# Patient Record
Sex: FEMALE | NOT HISPANIC OR LATINO | URBAN - METROPOLITAN AREA
[De-identification: names, ages, dates, MRNs, and addresses within clinical notes are randomized per-mention and may not be internally consistent; named-entity substitution may affect disease eponyms.]

---

## 2020-08-05 ENCOUNTER — TELEPHONE ENCOUNTER (OUTPATIENT)
Dept: URBAN - METROPOLITAN AREA CLINIC 35 | Facility: CLINIC | Age: 30
End: 2020-08-05

## 2020-08-05 RX ORDER — PANTOPRAZOLE SODIUM 40 MG/1
1 TABLET TABLET, DELAYED RELEASE ORAL
Qty: 90 | Refills: 1 | OUTPATIENT
Start: 2020-02-05

## 2020-08-12 ENCOUNTER — TELEPHONE ENCOUNTER (OUTPATIENT)
Dept: URBAN - METROPOLITAN AREA CLINIC 35 | Facility: CLINIC | Age: 30
End: 2020-08-12

## 2020-09-11 ENCOUNTER — OFFICE VISIT (OUTPATIENT)
Dept: URBAN - METROPOLITAN AREA CLINIC 31 | Facility: CLINIC | Age: 30
End: 2020-09-11

## 2020-09-21 ENCOUNTER — OFFICE VISIT (OUTPATIENT)
Dept: URBAN - METROPOLITAN AREA CLINIC 35 | Facility: CLINIC | Age: 30
End: 2020-09-21

## 2020-09-21 VITALS
DIASTOLIC BLOOD PRESSURE: 70 MMHG | HEIGHT: 66 IN | TEMPERATURE: 97.7 F | BODY MASS INDEX: 22.18 KG/M2 | SYSTOLIC BLOOD PRESSURE: 110 MMHG | WEIGHT: 138 LBS

## 2020-09-21 RX ORDER — HYOSCYAMINE SULFATE 0.125 MG
1 TABLET ON THE TONGUE AND ALLOW TO DISSOLVE  AS NEEDED TABLET,DISINTEGRATING ORAL
Qty: 30 | Refills: 1 | Status: ACTIVE | COMMUNITY
Start: 2020-01-08

## 2020-09-21 RX ORDER — PANTOPRAZOLE SODIUM 40 MG/1
1 TABLET TABLET, DELAYED RELEASE ORAL
Qty: 90 | Refills: 1 | Status: ACTIVE | COMMUNITY
Start: 2020-02-05

## 2020-09-21 RX ORDER — MESALAMINE 1.2 G/1
TAKE 4 TABLETS (4.8 G TOTAL) BY MOUTH DAILY WITH BREAKFAST TABLET, DELAYED RELEASE ORAL
Qty: 360 UNSPECIFIED | Refills: 3 | Status: ACTIVE | COMMUNITY

## 2020-09-21 RX ORDER — VITAMIN E ACETATE 670 MG
1 TABLET CAPSULE ORAL ONCE A DAY
Qty: 30 | Status: ACTIVE | COMMUNITY

## 2020-09-21 RX ORDER — ONDANSETRON 4 MG/1
1 TABLET TABLET ORAL
Qty: 30 | Refills: 1 | Status: ACTIVE | COMMUNITY
Start: 2020-01-08

## 2020-09-21 RX ORDER — SODIUM, POTASSIUM,MAG SULFATES 17.5-3.13G
177 ML SOLUTION, RECONSTITUTED, ORAL ORAL ONCE
Qty: 1 KIT | Refills: 0 | OUTPATIENT
Start: 2020-09-21

## 2020-09-21 RX ORDER — FERROUS SULFATE 325(65) MG
1 TABLET TABLET ORAL ONCE A DAY
Qty: 30 | Status: ACTIVE | COMMUNITY

## 2020-09-21 NOTE — EXAM-MIGRATED EXAMINATIONS
GENERAL APPEARANCE: - alert, in no acute distress, well developed, well nourished;   HEAD: - normocephalic, atraumatic;   EYES: - sclera anicteric bilaterally;   ORAL CAVITY: - mucosa moist;   THROAT: - clear;   NECK/THYROID: - neck supple, full range of motion, no thyromegaly, trachea midline;   SKIN: - warm and dry, no spider angiomata, palmar erythema or icterus;   HEART: - no murmurs, regular rate and rhythm, S1, S2 normal;   LUNGS: - clear to auscultation bilaterally, good air movement, no wheezes, rales, rhonchi;   ABDOMEN: - bowel sounds present, no masses palpable, no organomegaly , no rebound tenderness, soft, nontender, nondistended;   MUSCULOSKELETAL: - normal posture, normal gait and station, no decreased range of motion;   EXTREMITIES: - no clubbing, cyanosis, or edema;   PSYCH: - cooperative with exam, mood/affect full range;

## 2020-09-21 NOTE — HPI-MIGRATED HPI
Crohns : Patient presents today for a follow up of Crohn's disease.  Admits continued use of Lialda 4 tabs daily. She admits a sudden 5 lb weight loss. She admits some nausea during that time. She denies decrease of appetite, and emesis.  Patient admits 2 BM's per day, with soft stool. She admits intermittent episodes of abdominal pain. She admits rectal itching. GYN did rectal exam and she was told to have an inflammed skin tag. Denies melena, blood or mucus in stool. She admits/denies fatigue.  GI MD Note: patient is doing well on Lialda 4 tabs per day. One episode of severe abdominal pain 1 week ago that lasted 2 hours similar to her prior ones. Passing gas or flatus will help. Passing stool will also help. Gets nauseous. Patient is still breast feeding but working of weaning.    Last visit: (02/05/2020) Patient presents today for follow up of Crohn's disease and to review lab results. She continues to take Lialda 4 tabs daily. She denies any flares since her last visit.   Patient admits 1-2 BM's per day, with soft stool. Patient denies melena, blood, or mucus in stool. Patient denies fatigue. Patient denies N/V and abdominal pain.  Patient continues to admits intermittent episodes of abdominal discomfort through the night. Her most recent episode was last night 2/4 with abdominal cramps, and some nausea.   A bad episode: goes to bed doing well, then middle of night wakes up with crampy pain in whole abdomen. She tries to make herself pass gas in any way to see if it will relieve it. Belching and passing flatus makes it better. She gets nauseous. Then start having BM's initially soft then diarrhea. The entire episode will last for 3-4 hours. No fever.  Labs 1/14/2020: CBC-NL, Sed Rate- 6, CRP-3.1, Lactoferrin stool &lt;30.0, Calprotectin stool &lt;15.6  Last visit (1/8/2020) Patient presents today for follow up of Crohn's. Patient delivered on 10/22/2019.  She continues to take Lialda 4 tabs daily. Patient states has had 2 flares since delivery. Always in middle of night and she will be in pain for hours, belching, nauseous, frequent BM's that are soft. She will not feel like eating for the next 2 days and will stay on soft foods.  No heartburn/indigestion.  She admits 1-2 BM's per day, with soft stool. Patient denies melena, blood, or mucus in stool.   Patient intermittent abdominal pain. since she has delivered her daughter in October she admits 2 episodes of abdominal pain, fecal urgency, nausea, and eructations that occurred through the night and lasted 3-4 hours, followed by discomfort the entire next day.  Last visit (9/10/2019) Patient presents today for follow up of Crohn's and to review stool studies. She is 5 weeks away from her due date. Patient is on Lialda 4 tablets daily.  Patient admits 3-4 BM's per day, with soft stools,  instead of 4-6 BM's. Patient was recently found to be low on iron so her Obstetrician placed her on OTC Iron. She is not seeing any blood in stool. States she has always had borderline anemia, including as a child.  Patient also was evaluated by Cardiology recently due to palpitations. She was found to have low Calcium, EKG NL and she has appointment for ECHO on Friday  Patient denies intermittent abdominal pain.  8/27/19: stool for calpro was borderline at 71.3. Stool for lactoferrin was negative.  Sed Rate mildly elevated at 34 (NL up to 20), CRP 17 (NL up to 8) CBC: WBC 12.5  Hgb 11.5  HCT 34.1   K   Last visit (8/21/2019) 28 year old female presents today for consultation of Crohn's. She was diagnosed via colonoscopy November 20, 2016 preformed by Dr. Mnuiz. Patient states it took 6 yrs to make diagnosis. She saw 3 different GI MD's until she saw her GI MD at [facility].  Her symptoms then consisted of bloating, severe abdominal pain, stool frequency, no blood.  Pt was diagnosed with Crohn's via colonoscopy.  Initially treated with steroids and then Lialda and has been on Lialda ever since. EGD done at the time was fine and she describes a small bowel study and she thinks it was NL. She is currently on Mesalamine 1.2 GM 4 tablets once a day. Her last colonoscopy was March 2018 to confirm that she was in remission for her to get pregnant and everything was OK. She also did blood work then as well. Patient also has a Hx of GERD but mild symptoms, only needing medications PRN. Before pregnancy she was having 1-2 BM's a day. Right now she is in her 3 rd trimester and having increase BM's mostly after meals. Stool is soft and no blood. She also had some of the intense abdominal gas pains she used to have prior to her Crohn's diagnosis; had  4-5 of episodes in first trimester, none on second trimester and in third trimester 2-3 episodes so far. No fever. No sores in mouth but having congestion, cough, scratchy throat. No skin rashes, no back pain, no arthralgias, red or swollen joints Everything is going well with pregnancy.  Patient admits 4-6 bowel movements per day, stools are soft. Patient denies melena, blood, or mucus in stool. Patient admits that this has increased 5 weeks ago. Prior to that she was having 1-2 bowel movements a day.  Patient admits intermittent abdominal pain  Patient stays away from Lactose, spicy food, tomato sauce, popcorn, nuts/seeds.  ;   Gastroesophageal reflux disease : Patient admits control of GERD with the use of Pantoprazole 40 mg.;

## 2020-10-19 ENCOUNTER — TELEPHONE ENCOUNTER (OUTPATIENT)
Dept: URBAN - METROPOLITAN AREA CLINIC 35 | Facility: CLINIC | Age: 30
End: 2020-10-19

## 2020-10-19 LAB
% SATURATION: 34
ABSOLUTE BASOPHILS: 88
ABSOLUTE EOSINOPHILS: 29
ABSOLUTE LYMPHOCYTES: 1416
ABSOLUTE MONOCYTES: 329
ABSOLUTE NEUTROPHILS: 5439
ALBUMIN/GLOBULIN RATIO: 1.8
ALBUMIN: 4.4
ALKALINE PHOSPHATASE: 56
ALT: 10
AST: 16
BASOPHILS: 1.2
BILIRUBIN, DIRECT: 0.2
BILIRUBIN, INDIRECT: 0.6
BILIRUBIN, TOTAL: 0.8
BUN/CREATININE RATIO: (no result)
C-REACTIVE PROTEIN: 1
CALCIUM: 9.3
CARBON DIOXIDE: 23
CHLORIDE: 107
CREATININE: 0.72
EGFR AFRICAN AMERICAN: 131
EGFR NON-AFR. AMERICAN: 113
EOSINOPHILS: 0.4
FERRITIN: 95
FOLATE, SERUM: 21.1
GLOBULIN: 2.4
GLUCOSE: 83
HEMATOCRIT: 37.6
HEMOGLOBIN: 12.7
IRON BINDING CAPACITY: 290
IRON, TOTAL: 99
LYMPHOCYTES: 19.4
MCH: 29.1
MCHC: 33.8
MCV: 86.2
MONOCYTES: 4.5
MPV: 10.4
NEUTROPHILS: 74.5
PLATELET COUNT: 316
POTASSIUM: 4.3
PROTEIN, TOTAL: 6.8
RDW: 12
RED BLOOD CELL COUNT: 4.36
SED RATE BY MODIFIED: 2
SODIUM: 140
UREA NITROGEN (BUN): 11
VITAMIN B12: 543
WHITE BLOOD CELL COUNT: 7.3

## 2020-11-30 ENCOUNTER — TELEPHONE ENCOUNTER (OUTPATIENT)
Dept: URBAN - METROPOLITAN AREA CLINIC 35 | Facility: CLINIC | Age: 30
End: 2020-11-30

## 2020-12-14 ENCOUNTER — OFFICE VISIT (OUTPATIENT)
Dept: URBAN - METROPOLITAN AREA CLINIC 35 | Facility: CLINIC | Age: 30
End: 2020-12-14

## 2020-12-18 ENCOUNTER — OFFICE VISIT (OUTPATIENT)
Dept: URBAN - METROPOLITAN AREA SURGERY CENTER 8 | Facility: SURGERY CENTER | Age: 30
End: 2020-12-18

## 2021-01-04 ENCOUNTER — OFFICE VISIT (OUTPATIENT)
Dept: URBAN - METROPOLITAN AREA SURGERY CENTER 8 | Facility: SURGERY CENTER | Age: 31
End: 2021-01-04

## 2021-01-07 ENCOUNTER — OFFICE VISIT (OUTPATIENT)
Dept: URBAN - METROPOLITAN AREA CLINIC 35 | Facility: CLINIC | Age: 31
End: 2021-01-07

## 2021-01-22 ENCOUNTER — OFFICE VISIT (OUTPATIENT)
Dept: URBAN - METROPOLITAN AREA CLINIC 31 | Facility: CLINIC | Age: 31
End: 2021-01-22

## 2021-01-22 VITALS
HEIGHT: 66 IN | SYSTOLIC BLOOD PRESSURE: 120 MMHG | WEIGHT: 134 LBS | DIASTOLIC BLOOD PRESSURE: 80 MMHG | BODY MASS INDEX: 21.53 KG/M2

## 2021-01-22 PROBLEM — 235595009: Status: ACTIVE | Noted: 2020-09-21

## 2021-01-22 RX ORDER — SODIUM, POTASSIUM,MAG SULFATES 17.5-3.13G
177 ML SOLUTION, RECONSTITUTED, ORAL ORAL ONCE
Qty: 1 KIT | Refills: 0 | Status: ON HOLD | COMMUNITY
Start: 2020-09-21

## 2021-01-22 RX ORDER — MESALAMINE 1.2 G/1
TAKE 4 TABLETS (4.8 G TOTAL) BY MOUTH DAILY WITH BREAKFAST TABLET, DELAYED RELEASE ORAL
Qty: 360 UNSPECIFIED | Refills: 3 | Status: ACTIVE | COMMUNITY

## 2021-01-22 RX ORDER — HYOSCYAMINE SULFATE 0.125 MG
1 TABLET ON THE TONGUE AND ALLOW TO DISSOLVE  AS NEEDED TABLET,DISINTEGRATING ORAL
Qty: 30 | Refills: 1 | Status: ACTIVE | COMMUNITY
Start: 2020-01-08

## 2021-01-22 RX ORDER — ONDANSETRON 4 MG/1
1 TABLET TABLET ORAL
Qty: 30 | Refills: 1 | Status: ACTIVE | COMMUNITY
Start: 2020-01-08

## 2021-01-22 RX ORDER — FERROUS SULFATE 325(65) MG
1 TABLET TABLET ORAL ONCE A DAY
Qty: 30 | Status: ACTIVE | COMMUNITY

## 2021-01-22 RX ORDER — VITAMIN E ACETATE 670 MG
1 TABLET CAPSULE ORAL ONCE A DAY
Qty: 30 | Status: ON HOLD | COMMUNITY

## 2021-01-22 RX ORDER — PANTOPRAZOLE SODIUM 40 MG/1
1 TABLET TABLET, DELAYED RELEASE ORAL
Qty: 90 | Refills: 1 | Status: ACTIVE | COMMUNITY
Start: 2020-02-05

## 2021-01-22 NOTE — HPI-MIGRATED HPI
Crohns : Patient presents today for a follow up of Crohn's disease and to review EGD and colonoscopy.  Admits continued use of Lialda 4 tabs daily. She has been on Lialda since 2016. She states prior procedure she was having 1-2 BMs per day with urgency and soft stool. Stools are occasionally white or grey. The first BM of the day is with urgency.  3-4 BMs per day with urgency, stools are softer since the procedure. Stools are occasionally white or grey and not the whole stool but parts of it. No melena, blood, or mucus. Stools are always in AM and 20-30 minutes apart.  Patient has epigastric pain that occurred when it was full and when it was empty.   Patient would like to have a food sensitivity test.  Colonoscopy NL. Random colon bx's NL.  Last visit (9/21/2020) Patient presents today for a follow up of Crohn's disease.  Admits continued use of Lialda 4 tabs daily. She admits a sudden 5 lb weight loss. She admits some nausea during that time. She denies decrease of appetite, and emesis.  Patient admits 2 BM's per day, with soft stool. She admits intermittent episodes of abdominal pain. She admits rectal itching. GYN did rectal exam and she was told to have an inflamed skin tag. Denies melena, blood or mucus in stool. She admits/denies fatigue.  GI MD Note: patient is doing well on Lialda 4 tabs per day. One episode of severe abdominal pain 1 week ago that lasted 2 hours similar to her prior ones. Passing gas or flatus will help. Passing stool will also help. Gets nauseous. Patient is still breast feeding but working of weaning.    Last visit: (02/05/2020) Patient presents today for follow up of Crohn's disease and to review lab results. She continues to take Lialda 4 tabs daily. She denies any flares since her last visit.   Patient admits 1-2 BM's per day, with soft stool. Patient denies melena, blood, or mucus in stool. Patient denies fatigue. Patient denies N/V and abdominal pain.  Patient continues to admits intermittent episodes of abdominal discomfort through the night. Her most recent episode was last night 2/4 with abdominal cramps, and some nausea.   A bad episode: goes to bed doing well, then middle of night wakes up with crampy pain in whole abdomen. She tries to make herself pass gas in any way to see if it will relieve it. Belching and passing flatus makes it better. She gets nauseous. Then start having BM's initially soft then diarrhea. The entire episode will last for 3-4 hours. No fever.  Labs 1/14/2020: CBC-NL, Sed Rate- 6, CRP-3.1, Lactoferrin stool &lt;30.0, Calprotectin stool &lt;15.6  Last visit (1/8/2020) Patient presents today for follow up of Crohn's. Patient delivered on 10/22/2019.  She continues to take Lialda 4 tabs daily. Patient states has had 2 flares since delivery. Always in middle of night and she will be in pain for hours, belching, nauseous, frequent BM's that are soft. She will not feel like eating for the next 2 days and will stay on soft foods.  No heartburn/indigestion.  She admits 1-2 BM's per day, with soft stool. Patient denies melena, blood, or mucus in stool.   Patient intermittent abdominal pain. since she has delivered her daughter in October she admits 2 episodes of abdominal pain, fecal urgency, nausea, and eructations that occurred through the night and lasted 3-4 hours, followed by discomfort the entire next day.  Last visit (9/10/2019) Patient presents today for follow up of Crohn's and to review stool studies. She is 5 weeks away from her due date. Patient is on Lialda 4 tablets daily.  Patient admits 3-4 BM's per day, with soft stools,  instead of 4-6 BM's. Patient was recently found to be low on iron so her Obstetrician placed her on OTC Iron. She is not seeing any blood in stool. States she has always had borderline anemia, including as a child.  Patient also was evaluated by Cardiology recently due to palpitations. She was found to have low Calcium, EKG NL and she has appointment for ECHO on Friday  Patient denies intermittent abdominal pain.  8/27/19: stool for calpro was borderline at 71.3. Stool for lactoferrin was negative.  Sed Rate mildly elevated at 34 (NL up to 20), CRP 17 (NL up to 8) CBC: WBC 12.5  Hgb 11.5  HCT 34.1   K   Last visit (8/21/2019) 28 year old female presents today for consultation of Crohn's. She was diagnosed via colonoscopy November 20, 2016 preformed by Dr. Muniz. Patient states it took 6 yrs to make diagnosis. She saw 3 different GI MD's until she saw her GI MD at [facility].  Her symptoms then consisted of bloating, severe abdominal pain, stool frequency, no blood.  Pt was diagnosed with Crohn's via colonoscopy.  Initially treated with steroids and then Lialda and has been on Lialda ever since. EGD done at the time was fine and she describes a small bowel study and she thinks it was NL. She is currently on Mesalamine 1.2 GM 4 tablets once a day. Her last colonoscopy was March 2018 to confirm that she was in remission for her to get pregnant and everything was OK. She also did blood work then as well. Patient also has a Hx of GERD but mild symptoms, only needing medications PRN. Before pregnancy she was having 1-2 BM's a day. Right now she is in her 3 rd trimester and having increase BM's mostly after meals. Stool is soft and no blood. She also had some of the intense abdominal gas pains she used to have prior to her Crohn's diagnosis; had  4-5 of episodes in first trimester, none on second trimester and in third trimester 2-3 episodes so far. No fever. No sores in mouth but having congestion, cough, scratchy throat. No skin rashes, no back pain, no arthralgias, red or swollen joints Everything is going well with pregnancy.  Patient admits 4-6 bowel movements per day, stools are soft. Patient denies melena, blood, or mucus in stool. Patient admits that this has increased 5 weeks ago. Prior to that she was having 1-2 bowel movements a day.  Patient admits intermittent abdominal pain  Patient stays away from Lactose, spicy food, tomato sauce, popcorn, nuts/seeds.  ;   Gastroesophageal reflux disease : Patient continues to do well on Pantoprazole 40 mg daily. Patient states that she has a sore throat every day for months. She states that the varies in intensity day by day and is worse in the morning. She describes it as a scratchy feeling. She was seen by PCP whom stated she needed an EGD. She has tried adding Pepcid in the am for 2 weeks and now as needed. She was giving a Z-pack but did not use it.   Patient has epigastric pain that occurred when it was full and when it was empty.      Last visit (9/21/2020) Patient admits control of GERD with the use of Pantoprazole 40 mg.;

## 2021-01-27 ENCOUNTER — LAB OUTSIDE AN ENCOUNTER (OUTPATIENT)
Dept: URBAN - METROPOLITAN AREA CLINIC 35 | Facility: CLINIC | Age: 31
End: 2021-01-27

## 2021-01-27 ENCOUNTER — TELEPHONE ENCOUNTER (OUTPATIENT)
Dept: URBAN - METROPOLITAN AREA CLINIC 35 | Facility: CLINIC | Age: 31
End: 2021-01-27

## 2021-01-27 RX ORDER — MESALAMINE 1.2 G/1
TAKE 4 TABLETS (4.8 G TOTAL) BY MOUTH DAILY WITH BREAKFAST TABLET, DELAYED RELEASE ORAL ONCE A DAY
Qty: 360 UNSPECIFIED | Refills: 3 | OUTPATIENT

## 2021-01-30 LAB
C. DIFFICILE TOXIN A/B, STOOL - QDX: (no result)
CALPROTECTIN, STOOL - QDX: (no result)
FECAL FAT, QUALITATIVE: (no result)
GASTROINTESTINAL PATHOGEN: (no result)
OVA AND PARASITE - QDX: (no result)
PANCREATICELASTASE ELISA, STOOL: (no result)

## 2021-02-03 ENCOUNTER — TELEPHONE ENCOUNTER (OUTPATIENT)
Dept: URBAN - METROPOLITAN AREA CLINIC 35 | Facility: CLINIC | Age: 31
End: 2021-02-03

## 2021-02-15 LAB
ALMOND (F20) IGE: <0.1
CASHEW NUT (F202) IGE: <0.1
CLASS: 0
CODFISH (F3) IGE: <0.1
EGG WHITE (F1) IGE: <0.1
HAZELNUT (F17) IGE: <0.1
INTERPRETATION: (no result)
MILK (F2) IGE: <0.1
PEANUT (F13) IGE: <0.1
SALMON (F41) IGE: <0.1
SCALLOP (F338) IGE: <0.1
SESAME SEED (F10) IGE: <0.1
SHRIMP (F24) IGE: <0.1
SOYBEAN (F14) IGE: <0.1
TUNA (F40) IGE: <0.1
WALNUT (F256) IGE: <0.1
WHEAT (F4) IGE: <0.1

## 2021-02-21 ENCOUNTER — TELEPHONE ENCOUNTER (OUTPATIENT)
Dept: URBAN - METROPOLITAN AREA CLINIC 35 | Facility: CLINIC | Age: 31
End: 2021-02-21

## 2021-03-26 ENCOUNTER — OFFICE VISIT (OUTPATIENT)
Dept: URBAN - METROPOLITAN AREA CLINIC 31 | Facility: CLINIC | Age: 31
End: 2021-03-26

## 2022-01-18 ENCOUNTER — TELEPHONE ENCOUNTER (OUTPATIENT)
Dept: URBAN - METROPOLITAN AREA CLINIC 36 | Facility: CLINIC | Age: 32
End: 2022-01-18

## 2022-01-18 RX ORDER — PANTOPRAZOLE SODIUM 20 MG/1
1 TABLET TABLET, DELAYED RELEASE ORAL BID
Qty: 180 TABLET | Refills: 1
Start: 2020-02-05

## 2022-02-21 ENCOUNTER — TELEPHONE ENCOUNTER (OUTPATIENT)
Dept: URBAN - METROPOLITAN AREA CLINIC 36 | Facility: CLINIC | Age: 32
End: 2022-02-21

## 2022-02-22 ENCOUNTER — CLAIMS CREATED FROM THE CLAIM WINDOW (OUTPATIENT)
Dept: URBAN - METROPOLITAN AREA CLINIC 35 | Facility: CLINIC | Age: 32
End: 2022-02-22
Payer: COMMERCIAL

## 2022-02-22 ENCOUNTER — CLAIMS CREATED FROM THE CLAIM WINDOW (OUTPATIENT)
Dept: URBAN - METROPOLITAN AREA TELEHEALTH 2 | Facility: TELEHEALTH | Age: 32
End: 2022-02-22
Payer: COMMERCIAL

## 2022-02-22 ENCOUNTER — TELEPHONE ENCOUNTER (OUTPATIENT)
Dept: URBAN - METROPOLITAN AREA CLINIC 36 | Facility: CLINIC | Age: 32
End: 2022-02-22

## 2022-02-22 ENCOUNTER — OFFICE VISIT (OUTPATIENT)
Dept: URBAN - METROPOLITAN AREA CLINIC 36 | Facility: CLINIC | Age: 32
End: 2022-02-22
Payer: COMMERCIAL

## 2022-02-22 VITALS — HEIGHT: 66 IN | BODY MASS INDEX: 22.82 KG/M2 | WEIGHT: 142 LBS

## 2022-02-22 DIAGNOSIS — K50.10 CROHN'S DISEASE OF COLON WITHOUT COMPLICATION: ICD-10-CM

## 2022-02-22 DIAGNOSIS — Z86.39 HISTORY OF IRON DEFICIENCY: ICD-10-CM

## 2022-02-22 DIAGNOSIS — K21.9 GASTROESOPHAGEAL REFLUX DISEASE WITHOUT ESOPHAGITIS: ICD-10-CM

## 2022-02-22 PROBLEM — 266435005: Status: ACTIVE | Noted: 2022-02-22

## 2022-02-22 PROBLEM — 50440006: Status: ACTIVE | Noted: 2022-02-22

## 2022-02-22 PROCEDURE — 99214 OFFICE O/P EST MOD 30 MIN: CPT | Performed by: INTERNAL MEDICINE

## 2022-02-22 RX ORDER — MESALAMINE 1.2 G/1
TAKE 4 TABLETS (4.8 G TOTAL) BY MOUTH DAILY WITH BREAKFAST TABLET, DELAYED RELEASE ORAL ONCE A DAY
Qty: 360 UNSPECIFIED | Refills: 3 | Status: ACTIVE | COMMUNITY

## 2022-02-22 RX ORDER — SODIUM, POTASSIUM,MAG SULFATES 17.5-3.13G
177 ML SOLUTION, RECONSTITUTED, ORAL ORAL ONCE
Qty: 1 KIT | Refills: 0 | Status: ON HOLD | COMMUNITY
Start: 2020-09-21

## 2022-02-22 RX ORDER — MESALAMINE 1.2 G/1
4 TABLETS TABLET, DELAYED RELEASE ORAL ONCE A DAY
Qty: 360 TABLET | Refills: 3 | OUTPATIENT
Start: 2022-02-22 | End: 2023-02-17

## 2022-02-22 RX ORDER — FERROUS SULFATE 325(65) MG
1 TABLET TABLET ORAL ONCE A DAY
Qty: 30 | Status: ACTIVE | COMMUNITY

## 2022-02-22 RX ORDER — VITAMIN E ACETATE 670 MG
1 TABLET CAPSULE ORAL ONCE A DAY
Qty: 30 | Status: ON HOLD | COMMUNITY

## 2022-02-22 RX ORDER — PANTOPRAZOLE SODIUM 20 MG/1
1 TABLET TABLET, DELAYED RELEASE ORAL BID
Qty: 180 TABLET | Refills: 1 | Status: ACTIVE | COMMUNITY
Start: 2020-02-05

## 2022-02-22 RX ORDER — ONDANSETRON 4 MG/1
1 TABLET TABLET ORAL
Qty: 30 | Refills: 1 | Status: ACTIVE | COMMUNITY
Start: 2020-01-08

## 2022-02-22 RX ORDER — HYOSCYAMINE SULFATE 0.125 MG
1 TABLET ON THE TONGUE AND ALLOW TO DISSOLVE  AS NEEDED TABLET,DISINTEGRATING ORAL
Qty: 30 | Refills: 1 | Status: ON HOLD | COMMUNITY
Start: 2020-01-08

## 2022-02-22 RX ORDER — SERTRALINE HYDROCHLORIDE 50 MG/1
1 TABLET TABLET, FILM COATED ORAL ONCE A DAY
Status: ACTIVE | COMMUNITY

## 2022-02-22 NOTE — HPI-TODAY'S VISIT:
Patient presents today via Televisit and consent has been obtained.   Patient is being treated for Crohn's disease of large bowel without complication . Shee is taking Lialda 4 tab daily. Patient is 4 weeks postpartum.  She had no problems with her Crohn's disease during pregnacy; remained in remission.  1-2 BMs per day, with soft stool. No melena, blood, or mucus.  Her last colon was completed 1/5/21 and it was normal,  with also normal random colon bisopies.  QDX collected 1/28/21 with normal lactoferrin, and borderline calprotectin 95.3  She continues on Pantoprazole 20 mg BID with control of GERD symptoms.  Today, she does not have any active GI issues.  Does feel tire and wonders if her Iron levels are low again after pregnancy.

## 2022-02-23 ENCOUNTER — TELEPHONE ENCOUNTER (OUTPATIENT)
Dept: URBAN - METROPOLITAN AREA CLINIC 36 | Facility: CLINIC | Age: 32
End: 2022-02-23

## 2022-03-15 ENCOUNTER — LAB OUTSIDE AN ENCOUNTER (OUTPATIENT)
Dept: URBAN - METROPOLITAN AREA CLINIC 35 | Facility: CLINIC | Age: 32
End: 2022-03-15

## 2022-03-15 ENCOUNTER — TELEPHONE ENCOUNTER (OUTPATIENT)
Dept: URBAN - METROPOLITAN AREA CLINIC 36 | Facility: CLINIC | Age: 32
End: 2022-03-15

## 2022-03-16 ENCOUNTER — TELEPHONE ENCOUNTER (OUTPATIENT)
Dept: URBAN - METROPOLITAN AREA CLINIC 36 | Facility: CLINIC | Age: 32
End: 2022-03-16

## 2022-03-16 ENCOUNTER — LAB OUTSIDE AN ENCOUNTER (OUTPATIENT)
Dept: URBAN - METROPOLITAN AREA CLINIC 36 | Facility: CLINIC | Age: 32
End: 2022-03-16

## 2022-03-16 LAB
% SATURATION: 35
ABSOLUTE BASOPHILS: 98
ABSOLUTE EOSINOPHILS: 90
ABSOLUTE LYMPHOCYTES: 1860
ABSOLUTE MONOCYTES: 503
ABSOLUTE NEUTROPHILS: 4950
ALBUMIN/GLOBULIN RATIO: 1.8
ALBUMIN: 4.6
ALKALINE PHOSPHATASE: 73
ALT (SGPT): 68
AST (SGOT): 41
BASOPHILS: 1.3
BILIRUBIN, DIRECT: 0.1
BILIRUBIN, INDIRECT: 0.4
BILIRUBIN, TOTAL: 0.5
BUN/CREATININE RATIO: (no result)
C-REACTIVE PROTEIN, QUANT: 6.6
CALCIUM: 9.4
CARBON DIOXIDE: 28
CHLORIDE: 105
CREATININE: 0.69
EGFR AFRICAN AMERICAN: 135
EGFR NON-AFR. AMERICAN: 117
EOSINOPHILS: 1.2
FERRITIN: 178
FOLATE (FOLIC ACID), SERUM: 15.3
GLOBULIN: 2.6
GLUCOSE: 81
HEMATOCRIT: 41
HEMOGLOBIN: 13.4
IRON BINDING CAPACITY: 305
IRON, TOTAL: 106
LYMPHOCYTES: 24.8
MCH: 28.1
MCHC: 32.7
MCV: 86
MONOCYTES: 6.7
MPV: 9.8
NEUTROPHILS: 66
PLATELET COUNT: 308
POTASSIUM: 4
PROTEIN, TOTAL: 7.2
RDW: 12.6
RED BLOOD CELL COUNT: 4.77
SED RATE BY MODIFIED: 6
SODIUM: 140
UREA NITROGEN (BUN): 13
VITAMIN B12: 615
VITAMIN D,25-OH,TOTAL,IA: 26
WHITE BLOOD CELL COUNT: 7.5

## 2022-03-16 RX ORDER — PANTOPRAZOLE SODIUM 20 MG/1
1 TABLET TABLET, DELAYED RELEASE ORAL BID
Qty: 180 TABLET | Refills: 1 | Status: ACTIVE | COMMUNITY
Start: 2020-02-05

## 2022-03-16 RX ORDER — MESALAMINE 1.2 G/1
TAKE 4 TABLETS (4.8 G TOTAL) BY MOUTH DAILY WITH BREAKFAST TABLET, DELAYED RELEASE ORAL ONCE A DAY
Qty: 360 UNSPECIFIED | Refills: 3 | Status: ACTIVE | COMMUNITY

## 2022-03-16 RX ORDER — SERTRALINE HYDROCHLORIDE 50 MG/1
1 TABLET TABLET, FILM COATED ORAL ONCE A DAY
Status: ACTIVE | COMMUNITY

## 2022-03-16 RX ORDER — HYOSCYAMINE SULFATE 0.125 MG
1 TABLET ON THE TONGUE AND ALLOW TO DISSOLVE  AS NEEDED TABLET,DISINTEGRATING ORAL
Qty: 30 | Refills: 1 | Status: ON HOLD | COMMUNITY
Start: 2020-01-08

## 2022-03-16 RX ORDER — FERROUS SULFATE 325(65) MG
1 TABLET TABLET ORAL ONCE A DAY
Qty: 30 | Status: ACTIVE | COMMUNITY

## 2022-03-16 RX ORDER — VITAMIN E ACETATE 670 MG
1 TABLET CAPSULE ORAL ONCE A DAY
Qty: 30 | Status: ON HOLD | COMMUNITY

## 2022-03-16 RX ORDER — ERGOCALCIFEROL 1.25 MG/1
1 CAPSULE CAPSULE ORAL
Qty: 4 | Refills: 2 | OUTPATIENT
Start: 2022-03-16 | End: 2022-06-14

## 2022-03-16 RX ORDER — SODIUM, POTASSIUM,MAG SULFATES 17.5-3.13G
177 ML SOLUTION, RECONSTITUTED, ORAL ORAL ONCE
Qty: 1 KIT | Refills: 0 | Status: ON HOLD | COMMUNITY
Start: 2020-09-21

## 2022-03-16 RX ORDER — MESALAMINE 1.2 G/1
4 TABLETS TABLET, DELAYED RELEASE ORAL ONCE A DAY
Qty: 360 TABLET | Refills: 3 | Status: ACTIVE | COMMUNITY
Start: 2022-02-22 | End: 2023-02-17

## 2022-03-16 RX ORDER — ONDANSETRON 4 MG/1
1 TABLET TABLET ORAL
Qty: 30 | Refills: 1 | Status: ACTIVE | COMMUNITY
Start: 2020-01-08

## 2022-03-29 ENCOUNTER — LAB OUTSIDE AN ENCOUNTER (OUTPATIENT)
Dept: URBAN - METROPOLITAN AREA CLINIC 36 | Facility: CLINIC | Age: 32
End: 2022-03-29

## 2022-04-01 ENCOUNTER — TELEPHONE ENCOUNTER (OUTPATIENT)
Dept: URBAN - METROPOLITAN AREA CLINIC 36 | Facility: CLINIC | Age: 32
End: 2022-04-01

## 2022-04-06 LAB
ALBUMIN/GLOBULIN RATIO: 1.6
ALBUMIN: 4.3
ALKALINE PHOSPHATASE: 66
ALT (SGPT): 27
AST (SGOT): 20
BILIRUBIN, DIRECT: 0.2
BILIRUBIN, INDIRECT: 0.4
BILIRUBIN, TOTAL: 0.6
GGT: 11
GLOBULIN: 2.7
PROTEIN, TOTAL: 7

## 2022-04-11 ENCOUNTER — TELEPHONE ENCOUNTER (OUTPATIENT)
Dept: URBAN - METROPOLITAN AREA CLINIC 35 | Facility: CLINIC | Age: 32
End: 2022-04-11

## 2022-04-29 ENCOUNTER — ERX REFILL RESPONSE (OUTPATIENT)
Dept: URBAN - METROPOLITAN AREA CLINIC 35 | Facility: CLINIC | Age: 32
End: 2022-04-29

## 2022-04-29 RX ORDER — ERGOCALCIFEROL 1.25 MG/1
TAKE 1 CAPSULE BY MOUTH ONCE WEEKLY CAPSULE, LIQUID FILLED ORAL
Qty: 4 CAPSULE | Refills: 3 | OUTPATIENT

## 2022-04-29 RX ORDER — ERGOCALCIFEROL 1.25 MG/1
1 CAPSULE CAPSULE ORAL
Qty: 4 | Refills: 2 | OUTPATIENT

## 2022-05-10 ENCOUNTER — LAB OUTSIDE AN ENCOUNTER (OUTPATIENT)
Dept: URBAN - METROPOLITAN AREA CLINIC 35 | Facility: CLINIC | Age: 32
End: 2022-05-10

## 2022-07-01 ENCOUNTER — TELEPHONE ENCOUNTER (OUTPATIENT)
Dept: URBAN - METROPOLITAN AREA CLINIC 6 | Facility: CLINIC | Age: 32
End: 2022-07-01

## 2022-07-01 ENCOUNTER — TELEPHONE ENCOUNTER (OUTPATIENT)
Dept: URBAN - METROPOLITAN AREA CLINIC 35 | Facility: CLINIC | Age: 32
End: 2022-07-01

## 2022-07-01 RX ORDER — CLOTRIMAZOLE AND BETAMETHASONE DIPROPIONATE 10; .5 MG/G; MG/G
1 APPLICATION CREAM TOPICAL
Qty: 1 | Refills: 1 | OUTPATIENT
Start: 2022-07-13 | End: 2022-07-27

## 2022-08-12 ENCOUNTER — LAB OUTSIDE AN ENCOUNTER (OUTPATIENT)
Dept: URBAN - METROPOLITAN AREA CLINIC 35 | Facility: CLINIC | Age: 32
End: 2022-08-12

## 2022-08-17 LAB
% SATURATION: 30
ALBUMIN/GLOBULIN RATIO: 1.7
ALBUMIN: 4.7
ALKALINE PHOSPHATASE: 69
ALT (SGPT): 23
AST (SGOT): 18
BILIRUBIN, DIRECT: 0.1
BILIRUBIN, INDIRECT: 0.3
BILIRUBIN, TOTAL: 0.4
FERRITIN: 85
GGT: 10
GLOBULIN: 2.8
IRON BINDING CAPACITY: 306
IRON, TOTAL: 92
PROTEIN, TOTAL: 7.5
VITAMIN D,25-OH,TOTAL,IA: 55

## 2022-08-23 ENCOUNTER — LAB OUTSIDE AN ENCOUNTER (OUTPATIENT)
Dept: URBAN - METROPOLITAN AREA CLINIC 35 | Facility: CLINIC | Age: 32
End: 2022-08-23

## 2022-08-23 ENCOUNTER — OFFICE VISIT (OUTPATIENT)
Dept: URBAN - METROPOLITAN AREA CLINIC 35 | Facility: CLINIC | Age: 32
End: 2022-08-23
Payer: COMMERCIAL

## 2022-08-23 VITALS
DIASTOLIC BLOOD PRESSURE: 84 MMHG | WEIGHT: 136 LBS | HEIGHT: 66 IN | OXYGEN SATURATION: 99 % | SYSTOLIC BLOOD PRESSURE: 113 MMHG | BODY MASS INDEX: 21.86 KG/M2 | HEART RATE: 88 BPM

## 2022-08-23 DIAGNOSIS — R10.10 UPPER ABDOMINAL PAIN: ICD-10-CM

## 2022-08-23 DIAGNOSIS — K21.9 GASTROESOPHAGEAL REFLUX DISEASE, UNSPECIFIED WHETHER ESOPHAGITIS PRESENT: ICD-10-CM

## 2022-08-23 DIAGNOSIS — K50.80 CROHN'S COLITIS: ICD-10-CM

## 2022-08-23 PROCEDURE — 99214 OFFICE O/P EST MOD 30 MIN: CPT | Performed by: INTERNAL MEDICINE

## 2022-08-23 RX ORDER — SERTRALINE HYDROCHLORIDE 25 MG/1
1 TABLET TABLET, FILM COATED ORAL ONCE A DAY
Status: ACTIVE | COMMUNITY

## 2022-08-23 RX ORDER — ONDANSETRON 4 MG/1
1 TABLET TABLET ORAL
Qty: 30 | Refills: 1 | Status: ACTIVE | COMMUNITY
Start: 2020-01-08

## 2022-08-23 RX ORDER — MESALAMINE 1.2 G/1
TAKE 4 TABLETS (4.8 G TOTAL) BY MOUTH DAILY WITH BREAKFAST TABLET, DELAYED RELEASE ORAL ONCE A DAY
Qty: 360 UNSPECIFIED | Refills: 3 | Status: ACTIVE | COMMUNITY

## 2022-08-23 RX ORDER — SODIUM PICOSULFATE, MAGNESIUM OXIDE, AND ANHYDROUS CITRIC ACID 10; 3.5; 12 MG/160ML; G/160ML; G/160ML
160 ML LIQUID ORAL
Qty: 320 MILLILITER | Refills: 0 | OUTPATIENT
Start: 2022-08-23 | End: 2022-08-24

## 2022-08-23 RX ORDER — HYOSCYAMINE SULFATE 0.125 MG
1 TABLET ON THE TONGUE AND ALLOW TO DISSOLVE  AS NEEDED TABLET,DISINTEGRATING ORAL
Qty: 30 | Refills: 1 | Status: ON HOLD | COMMUNITY
Start: 2020-01-08

## 2022-08-23 RX ORDER — FERROUS SULFATE 325(65) MG
1 TABLET TABLET ORAL ONCE A DAY
Qty: 30 | Status: DISCONTINUED | COMMUNITY

## 2022-08-23 RX ORDER — PANTOPRAZOLE SODIUM 20 MG/1
1 TABLET TABLET, DELAYED RELEASE ORAL BID
Qty: 180 TABLET | Refills: 1 | Status: ACTIVE | COMMUNITY
Start: 2020-02-05

## 2022-08-23 RX ORDER — ERGOCALCIFEROL 1.25 MG/1
TAKE 1 CAPSULE BY MOUTH ONCE WEEKLY CAPSULE, LIQUID FILLED ORAL
Qty: 4 CAPSULE | Refills: 3 | Status: ACTIVE | COMMUNITY

## 2022-08-23 RX ORDER — SODIUM, POTASSIUM,MAG SULFATES 17.5-3.13G
177 ML SOLUTION, RECONSTITUTED, ORAL ORAL ONCE
Qty: 1 KIT | Refills: 0 | Status: ON HOLD | COMMUNITY
Start: 2020-09-21

## 2022-08-23 RX ORDER — VITAMIN E ACETATE 670 MG
1 TABLET CAPSULE ORAL ONCE A DAY
Qty: 30 | Status: ACTIVE | COMMUNITY

## 2022-08-23 NOTE — HPI-TODAY'S VISIT:
Patient presents for 6 months follow up. Pt has a hx of Ileocolonic Crohn's disease; diagnosis made elsewhere in 2016. She admits taking Mesalamine 1.2 gm with improvement of symptoms. Patient admits completing liver panel with abnormal results then. As indicated by us,  she decreased Mesalamine dose to 3 pills a day, which she states aggravated symptoms of abdominal pain, increased bowel movements and urgency. She has since increased Mesalamine to original dosage of 4 tablets. She admits symptoms have improved and decreased to occasional episodes.   Currently, has 2 bowel movements a day without strain. She denies the presence of mucus in stool. No melena. She admits  few episodes of blood in stools. Blood was present both in toilet bowl and toilet paper. Blood was bright red. She admits "tearing"  may be associated to blood. She does not recall any other episodes of bleeding.  She admits continued use of Pantoprazole 20 mg BID with control of GERD symptoms. Lab work was completed. Patient sometimes feels "abdomen feels miserable". Pain in upper abdomen, mild nausea. No emesis. GERD wise, she is feeling fine on meds  Review of old records available, I am placing under her PMHx, so it will carry to other notes.

## 2022-09-01 ENCOUNTER — TELEPHONE ENCOUNTER (OUTPATIENT)
Dept: URBAN - METROPOLITAN AREA CLINIC 36 | Facility: CLINIC | Age: 32
End: 2022-09-01

## 2022-09-01 RX ORDER — PANTOPRAZOLE SODIUM 20 MG/1
1 TABLET TABLET, DELAYED RELEASE ORAL BID
Qty: 180 TABLET | Refills: 3 | OUTPATIENT
Start: 2022-09-01

## 2022-09-01 RX ORDER — SODIUM SULFATE, MAGNESIUM SULFATE, AND POTASSIUM CHLORIDE 17.75; 2.7; 2.25 G/1; G/1; G/1
12 TABLETS TABLET ORAL
Qty: 24 TABLETS | Refills: 0 | OUTPATIENT
Start: 2022-09-01 | End: 2022-09-02

## 2022-09-05 PROBLEM — 235595009: Status: ACTIVE | Noted: 2022-08-23

## 2022-10-03 ENCOUNTER — CLAIMS CREATED FROM THE CLAIM WINDOW (OUTPATIENT)
Dept: URBAN - METROPOLITAN AREA CLINIC 4 | Facility: CLINIC | Age: 32
End: 2022-10-03
Payer: COMMERCIAL

## 2022-10-03 ENCOUNTER — OFFICE VISIT (OUTPATIENT)
Dept: URBAN - METROPOLITAN AREA SURGERY CENTER 8 | Facility: SURGERY CENTER | Age: 32
End: 2022-10-03
Payer: COMMERCIAL

## 2022-10-03 DIAGNOSIS — K29.70 GASTRITIS, UNSPECIFIED, WITHOUT BLEEDING: ICD-10-CM

## 2022-10-03 DIAGNOSIS — K31.89 OTHER DISEASES OF STOMACH AND DUODENUM: ICD-10-CM

## 2022-10-03 DIAGNOSIS — K63.89 OTHER SPECIFIED DISEASES OF INTESTINE: ICD-10-CM

## 2022-10-03 DIAGNOSIS — K31.89 ACQUIRED DEFORMITY OF DUODENUM: ICD-10-CM

## 2022-10-03 DIAGNOSIS — R10.11 ABDOMINAL BURNING SENSATION IN RIGHT UPPER QUADRANT: ICD-10-CM

## 2022-10-03 DIAGNOSIS — R10.12 ABDOMINAL BURNING SENSATION IN LEFT UPPER QUADRANT: ICD-10-CM

## 2022-10-03 DIAGNOSIS — K63.89 BACTERIAL OVERGROWTH SYNDROME: ICD-10-CM

## 2022-10-03 PROCEDURE — 43239 EGD BIOPSY SINGLE/MULTIPLE: CPT | Performed by: INTERNAL MEDICINE

## 2022-10-03 PROCEDURE — G8907 PT DOC NO EVENTS ON DISCHARG: HCPCS | Performed by: INTERNAL MEDICINE

## 2022-10-03 PROCEDURE — 45380 COLONOSCOPY AND BIOPSY: CPT | Performed by: INTERNAL MEDICINE

## 2022-10-03 PROCEDURE — 88305 TISSUE EXAM BY PATHOLOGIST: CPT | Performed by: PATHOLOGY

## 2022-10-03 PROCEDURE — 88312 SPECIAL STAINS GROUP 1: CPT | Performed by: PATHOLOGY

## 2022-11-01 ENCOUNTER — OFFICE VISIT (OUTPATIENT)
Dept: URBAN - METROPOLITAN AREA CLINIC 35 | Facility: CLINIC | Age: 32
End: 2022-11-01

## 2022-11-01 ENCOUNTER — OFFICE VISIT (OUTPATIENT)
Dept: URBAN - METROPOLITAN AREA CLINIC 35 | Facility: CLINIC | Age: 32
End: 2022-11-01
Payer: COMMERCIAL

## 2022-11-01 VITALS
WEIGHT: 133 LBS | OXYGEN SATURATION: 98 % | DIASTOLIC BLOOD PRESSURE: 72 MMHG | BODY MASS INDEX: 21.38 KG/M2 | SYSTOLIC BLOOD PRESSURE: 107 MMHG | HEART RATE: 82 BPM | HEIGHT: 66 IN

## 2022-11-01 DIAGNOSIS — K62.5 RECTAL BLEEDING: ICD-10-CM

## 2022-11-01 DIAGNOSIS — R19.4 ALTERED BOWEL HABITS: ICD-10-CM

## 2022-11-01 DIAGNOSIS — K64.8 INTERNAL HEMORRHOIDS: ICD-10-CM

## 2022-11-01 DIAGNOSIS — K50.80 CROHN'S DISEASE OF BOTH SMALL AND LARGE INTESTINE WITHOUT COMPLICATION: ICD-10-CM

## 2022-11-01 PROBLEM — 71833008: Status: ACTIVE | Noted: 2022-11-01

## 2022-11-01 PROBLEM — 34000006 CROHN'S DISEASE: Status: ACTIVE | Noted: 2022-08-23

## 2022-11-01 PROCEDURE — 99214 OFFICE O/P EST MOD 30 MIN: CPT | Performed by: INTERNAL MEDICINE

## 2022-11-01 RX ORDER — MESALAMINE 1.2 G/1
TAKE 4 TABLETS (4.8 G TOTAL) BY MOUTH DAILY WITH BREAKFAST TABLET, DELAYED RELEASE ORAL ONCE A DAY
Qty: 360 UNSPECIFIED | Refills: 3 | Status: ON HOLD | COMMUNITY

## 2022-11-01 RX ORDER — ONDANSETRON 4 MG/1
1 TABLET TABLET ORAL
Qty: 30 | Refills: 1 | Status: ON HOLD | COMMUNITY
Start: 2020-01-08

## 2022-11-01 RX ORDER — HYOSCYAMINE SULFATE 0.125 MG
1 TABLET ON THE TONGUE AND ALLOW TO DISSOLVE  AS NEEDED TABLET,DISINTEGRATING ORAL
Qty: 30 | Refills: 1 | Status: ON HOLD | COMMUNITY
Start: 2020-01-08

## 2022-11-01 RX ORDER — HYDROCORTISONE ACETATE 25 MG/1
1 SUPPOSITORY SUPPOSITORY RECTAL TWICE A DAY
Qty: 28 | Refills: 1 | OUTPATIENT
Start: 2022-11-01 | End: 2022-11-28

## 2022-11-01 RX ORDER — SERTRALINE HYDROCHLORIDE 25 MG/1
1 TABLET TABLET, FILM COATED ORAL ONCE A DAY
Status: ON HOLD | COMMUNITY

## 2022-11-01 RX ORDER — VITAMIN E ACETATE 670 MG
1 TABLET CAPSULE ORAL ONCE A DAY
Qty: 30 | Status: ACTIVE | COMMUNITY

## 2022-11-01 RX ORDER — ERGOCALCIFEROL 1.25 MG/1
TAKE 1 CAPSULE BY MOUTH ONCE WEEKLY CAPSULE, LIQUID FILLED ORAL
Qty: 4 CAPSULE | Refills: 3 | Status: ACTIVE | COMMUNITY

## 2022-11-01 RX ORDER — PANTOPRAZOLE SODIUM 20 MG/1
1 TABLET TABLET, DELAYED RELEASE ORAL BID
Qty: 180 TABLET | Refills: 1 | Status: ON HOLD | COMMUNITY
Start: 2020-02-05

## 2022-11-01 RX ORDER — PANTOPRAZOLE SODIUM 20 MG/1
1 TABLET TABLET, DELAYED RELEASE ORAL BID
Qty: 180 TABLET | Refills: 3 | Status: ON HOLD | COMMUNITY
Start: 2022-09-01

## 2022-11-01 RX ORDER — SODIUM, POTASSIUM,MAG SULFATES 17.5-3.13G
177 ML SOLUTION, RECONSTITUTED, ORAL ORAL ONCE
Qty: 1 KIT | Refills: 0 | Status: ON HOLD | COMMUNITY
Start: 2020-09-21

## 2022-11-01 NOTE — HPI-TODAY'S VISIT:
32 Year old female patient presents today for Colonoscopy and EGD follow up. Patient denies/ admits complications after procedures. Patient currently reports ... bowel movements a day with/ out strain. Stools are ... Denies/ admtis the presence of blood, mucus, nor melena.  Since the procedure patient _______ dysphagia, globus, changes in appetite, and changes in bowel habits.  EGD Impression: - Z-line regular, 39 cm from the incisors. - Normal esophagus. - Normal stomach.  Biopsied. - Normal examined duodenum.  Biopsied.  Colonoscopy Impression:  The examined portion of the ileum was normal.  Biopsied. - The ascending colon and cecum are normal.  Biopsied. - The transverse colon is normal.  Biopsied. - The sigmoid colon and descending colon are normal.  Biopsied. - The rectum is normal.  Biopsied. - Non-bleeding internal hemorrhoids  Pathology: (A) Duodenum, Bulb, Second Part, Biopsy: NO SIGNIFICANT ABNORMALITY. (B) Stomach, Antrum and Body, Biopsy: CHEMICAL/REACTIVE GASTROPATHY WITH FOCAL HEALING EROSION.  No Evidence of H. Pylori Organisms or Intestinal Metaplasia.  Negative for Dysplasia or Malignancy. (C) Terminal Ileum, Biopsy: NO SIGNIFICANT ABNORMALITY. (D) Cecum, Ascending, Biopsy: NO SIGNIFICANT ABNORMALITY. (E) Colon, Transverse, Biopsy: NO SIGNIFICANT ABNORMALITY. (F) Colon, Descending, Sigmoid, Biopsy: NO SIGNIFICANT ABNORMALITY. (G) Rectum, Biopsy: NO SIGNIFICANT ABNORMALITY

## 2022-11-01 NOTE — HPI-TODAY'S VISIT:
32 Year old female patient presents today for Colonoscopy and EGD follow up. Patient carries a hx of Crohn's disease of large and small bowel. Patient denies complications after procedures. Patient currently reports 1-2 bowel movements a day with strain. Stools are semi firm. Denies the presence of mucus nor melena. Admits the presence of blood in toilet bowel, enough to tint water red. Patient admits she has been constipated and strain to pass a bowel movement. Since the procedure patient denies dysphagia, globus, changes in appetite.  Admits change in bowel habits occurred once she stopped medications she was previously on.   EGD Impression: - Z-line regular, 39 cm from the incisors. - Normal esophagus. - Normal stomach. Biopsied. - Normal examined duodenum. Biopsied. Colonoscopy Impression:  The examined portion of the ileum was normal. Biopsied. - The ascending colon and cecum are normal. Biopsied. - The transverse colon is normal. Biopsied. - The sigmoid colon and descending colon are normal. Biopsied. - The rectum is normal. Biopsied. - Non-bleeding internal hemorrhoids  Pathology: (A) Duodenum, Bulb, Second Part, Biopsy: NO SIGNIFICANT ABNORMALITY. (B) Stomach, Antrum and Body, Biopsy: CHEMICAL/REACTIVE GASTROPATHY WITH FOCAL HEALING EROSION.  No Evidence of H. Pylori Organisms or Intestinal Metaplasia.  Negative for Dysplasia or Malignancy. (C) Terminal Ileum, Biopsy: NO SIGNIFICANT ABNORMALITY. (D) Cecum, Ascending, Biopsy: NO SIGNIFICANT ABNORMALITY. (E) Colon, Transverse, Biopsy: NO SIGNIFICANT ABNORMALITY. (F) Colon, Descending, Sigmoid, Biopsy: NO SIGNIFICANT ABNORMALITY. (G) Rectum, Biopsy: NO SIGNIFICANT ABNORMALITY.   GI MD Note: Patient, since stopping Lialda, is straining to have a BM. Patient though is having BM's daily, 1-2/day. Blood is fresh and both on tissue and sometimes in toilet. There is some discomfort in anal area. Patient has has fissures in past and at present time BM's will cause pain/discomfort.

## 2023-04-07 ENCOUNTER — TELEPHONE ENCOUNTER (OUTPATIENT)
Dept: URBAN - METROPOLITAN AREA CLINIC 36 | Facility: CLINIC | Age: 33
End: 2023-04-07

## 2023-04-07 RX ORDER — DICYCLOMINE HYDROCHLORIDE 20 MG/1
1 TABLET AS NEEDED TABLET ORAL THREE TIMES A DAY
Qty: 45 TABLET | Refills: 2 | OUTPATIENT
Start: 2023-04-07 | End: 2023-05-22

## 2023-04-18 ENCOUNTER — OFFICE VISIT (OUTPATIENT)
Dept: URBAN - METROPOLITAN AREA CLINIC 35 | Facility: CLINIC | Age: 33
End: 2023-04-18
Payer: COMMERCIAL

## 2023-04-18 ENCOUNTER — LAB OUTSIDE AN ENCOUNTER (OUTPATIENT)
Dept: URBAN - METROPOLITAN AREA CLINIC 35 | Facility: CLINIC | Age: 33
End: 2023-04-18

## 2023-04-18 VITALS
HEIGHT: 66 IN | HEART RATE: 82 BPM | OXYGEN SATURATION: 100 % | WEIGHT: 149 LBS | SYSTOLIC BLOOD PRESSURE: 123 MMHG | BODY MASS INDEX: 23.95 KG/M2 | DIASTOLIC BLOOD PRESSURE: 92 MMHG

## 2023-04-18 DIAGNOSIS — R10.30 LOWER ABDOMINAL PAIN: ICD-10-CM

## 2023-04-18 DIAGNOSIS — R19.8 CHANGE IN BOWEL MOVEMENT: ICD-10-CM

## 2023-04-18 DIAGNOSIS — K62.89 ANAL PAIN: ICD-10-CM

## 2023-04-18 DIAGNOSIS — K50.818 CROHN'S DISEASE OF BOTH SMALL AND LARGE INTESTINE WITH OTHER COMPLICATION: ICD-10-CM

## 2023-04-18 DIAGNOSIS — L29.0 PRURITUS ANI: ICD-10-CM

## 2023-04-18 PROBLEM — 71833008: Status: ACTIVE | Noted: 2023-04-18

## 2023-04-18 PROCEDURE — 99214 OFFICE O/P EST MOD 30 MIN: CPT | Performed by: INTERNAL MEDICINE

## 2023-04-18 RX ORDER — DICYCLOMINE HYDROCHLORIDE 20 MG/1
1 TABLET AS NEEDED TABLET ORAL THREE TIMES A DAY
Qty: 45 TABLET | Refills: 2 | Status: ON HOLD | COMMUNITY
Start: 2023-04-07 | End: 2023-05-22

## 2023-04-18 RX ORDER — VITAMIN E ACETATE 670 MG
1 TABLET CAPSULE ORAL ONCE A DAY
Qty: 30 | Status: DISCONTINUED | COMMUNITY

## 2023-04-18 RX ORDER — CLOTRIMAZOLE AND BETAMETHASONE DIPROPIONATE 10; .5 MG/G; MG/G
1 APPLICATION TO ANAL AREA CREAM TOPICAL TWICE A DAY
Qty: 1 | Refills: 0 | OUTPATIENT
Start: 2023-04-18 | End: 2023-05-02

## 2023-04-18 RX ORDER — ONDANSETRON 4 MG/1
1 TABLET TABLET ORAL
Qty: 30 | Refills: 1 | COMMUNITY
Start: 2020-01-08

## 2023-04-18 RX ORDER — PANTOPRAZOLE SODIUM 20 MG/1
1 TABLET TABLET, DELAYED RELEASE ORAL BID
Qty: 180 TABLET | Refills: 1 | COMMUNITY
Start: 2020-02-05

## 2023-04-18 RX ORDER — ERGOCALCIFEROL 1.25 MG/1
TAKE 1 CAPSULE BY MOUTH ONCE WEEKLY CAPSULE, LIQUID FILLED ORAL
Qty: 4 CAPSULE | Refills: 3 | Status: ACTIVE | COMMUNITY

## 2023-04-18 RX ORDER — MESALAMINE 1.2 G/1
TAKE 4 TABLETS (4.8 G TOTAL) BY MOUTH DAILY WITH BREAKFAST TABLET, DELAYED RELEASE ORAL ONCE A DAY
Qty: 360 UNSPECIFIED | Refills: 3 | COMMUNITY

## 2023-04-18 RX ORDER — ESCITALOPRAM OXALATE 10 MG/1
1 TABLET TABLET, FILM COATED ORAL ONCE A DAY
Status: ACTIVE | COMMUNITY

## 2023-04-18 RX ORDER — PANTOPRAZOLE SODIUM 20 MG/1
1 TABLET TABLET, DELAYED RELEASE ORAL BID
Qty: 180 TABLET | Refills: 3 | COMMUNITY
Start: 2022-09-01

## 2023-04-18 RX ORDER — SERTRALINE HYDROCHLORIDE 25 MG/1
1 TABLET TABLET, FILM COATED ORAL ONCE A DAY
COMMUNITY

## 2023-04-18 RX ORDER — HYOSCYAMINE SULFATE 0.125 MG
1 TABLET ON THE TONGUE AND ALLOW TO DISSOLVE  AS NEEDED TABLET,DISINTEGRATING ORAL
Qty: 30 | Refills: 1 | COMMUNITY
Start: 2020-01-08

## 2023-04-18 RX ORDER — SODIUM, POTASSIUM,MAG SULFATES 17.5-3.13G
177 ML SOLUTION, RECONSTITUTED, ORAL ORAL ONCE
Qty: 1 KIT | Refills: 0 | COMMUNITY
Start: 2020-09-21

## 2023-04-18 NOTE — PHYSICAL EXAM GASTROINTESTINAL
Abdomen , soft, mild tenderness lower abdomen, RLQ>LLQ, nondistended , no guarding or rigidity , no masses palpable , normal bowel sounds , Liver and Spleen,  no hepatosplenomegaly , liver nontender

## 2023-04-18 NOTE — HPI-TODAY'S VISIT:
32 Year old female patient presents today for a follow up of Crohn's. Patient was previously on Lialda, however has discontinued use since 11/2022 due to be in remission. She admits most recent flare up was 4/7/2023. She reported 2-4 bowel movements with loose, soft stools, no blood in stool, nausea without emesis. Patient was prescribed Dicyclomine 20 mg and was instructed to TID PRN and OTC Imodium. She denies taking either medications. She states her symptoms have subsided. Patient states she believes the symptoms she was experiencing were related to her ovaries, and she was seen at her OB GYN who performed an ultrasound revealing normal results.     She currently admits occasional abdominal pain/ sensitivity, gas, and weight gain. Denies nausea, vomiting or unintentional weight loss. She currently reports 2-5 bowel movements per day, with occasional strain. Her stools changes between semi firm and soft. She denies any mucus, melena or blood in stools. She admits any pruritus ani or rectal pain. Patient states in occasions she has the urge to have a bowel movement however is unable to.   GI MD: 1 month ago, she had her menstrual period. 2 weeks later, had again vaginal bleeding. She attributed to the fact she had started working out. Then had a day of severe nausea. Felt drained. Later on that day developed lower abdominal and lower back pain. Pain felt like a menstrual type pain. Feeling that it was all GYN related, she had appointment with GYN. Vaginal exam was NL. Pelvic US done and it was NL. BM wise, she had increased in BM's from once a day to 5 BMs a day. Some are soft and some times she is straining. No bleeding.   + pain in anus with BM's and + pruritus. Abdomen was tender on exam

## 2023-04-24 LAB
ABSOLUTE BASOPHILS: 83
ABSOLUTE EOSINOPHILS: 41
ABSOLUTE LYMPHOCYTES: 1192
ABSOLUTE MONOCYTES: 254
ABSOLUTE NEUTROPHILS: 4331
BASOPHILS: 1.4
C-REACTIVE PROTEIN, QUANT: 1.3
EOSINOPHILS: 0.7
HEMATOCRIT: 35.4
HEMOGLOBIN: 12
LYMPHOCYTES: 20.2
MCH: 29.1
MCHC: 33.9
MCV: 85.7
MONOCYTES: 4.3
MPV: 10.3
NEUTROPHILS: 73.4
PLATELET COUNT: 322
RDW: 12.3
RED BLOOD CELL COUNT: 4.13
SED RATE BY MODIFIED: 6
WHITE BLOOD CELL COUNT: 5.9

## 2023-05-01 ENCOUNTER — LAB OUTSIDE AN ENCOUNTER (OUTPATIENT)
Dept: URBAN - METROPOLITAN AREA CLINIC 35 | Facility: CLINIC | Age: 33
End: 2023-05-01

## 2023-05-02 ENCOUNTER — OFFICE VISIT (OUTPATIENT)
Dept: URBAN - METROPOLITAN AREA CLINIC 35 | Facility: CLINIC | Age: 33
End: 2023-05-02

## 2023-05-05 ENCOUNTER — TELEPHONE ENCOUNTER (OUTPATIENT)
Dept: URBAN - METROPOLITAN AREA CLINIC 35 | Facility: CLINIC | Age: 33
End: 2023-05-05

## 2023-05-05 RX ORDER — MESALAMINE 1.2 G/1
TAKE 4 TABLETS (4.8 G TOTAL) BY MOUTH DAILY WITH BREAKFAST TABLET, DELAYED RELEASE ORAL ONCE A DAY
Qty: 360 UNSPECIFIED | Refills: 3 | COMMUNITY

## 2023-05-05 RX ORDER — ESCITALOPRAM OXALATE 10 MG/1
1 TABLET TABLET, FILM COATED ORAL ONCE A DAY
Status: ACTIVE | COMMUNITY

## 2023-05-05 RX ORDER — PANTOPRAZOLE SODIUM 20 MG/1
1 TABLET TABLET, DELAYED RELEASE ORAL BID
Qty: 180 TABLET | Refills: 3 | COMMUNITY
Start: 2022-09-01

## 2023-05-05 RX ORDER — PANTOPRAZOLE SODIUM 20 MG/1
1 TABLET TABLET, DELAYED RELEASE ORAL BID
Qty: 180 TABLET | Refills: 1 | COMMUNITY
Start: 2020-02-05

## 2023-05-05 RX ORDER — SODIUM, POTASSIUM,MAG SULFATES 17.5-3.13G
177 ML SOLUTION, RECONSTITUTED, ORAL ORAL ONCE
Qty: 1 KIT | Refills: 0 | COMMUNITY
Start: 2020-09-21

## 2023-05-05 RX ORDER — SERTRALINE HYDROCHLORIDE 25 MG/1
1 TABLET TABLET, FILM COATED ORAL ONCE A DAY
COMMUNITY

## 2023-05-05 RX ORDER — ONDANSETRON 4 MG/1
1 TABLET TABLET ORAL
Qty: 30 | Refills: 1 | COMMUNITY
Start: 2020-01-08

## 2023-05-05 RX ORDER — HYOSCYAMINE SULFATE 0.125 MG
1 TABLET ON THE TONGUE AND ALLOW TO DISSOLVE  AS NEEDED TABLET,DISINTEGRATING ORAL
Qty: 30 | Refills: 1 | COMMUNITY
Start: 2020-01-08

## 2023-05-05 RX ORDER — RIFAXIMIN 550 MG/1
1 TABLET TABLET ORAL THREE TIMES A DAY
Qty: 42 TABLET | Refills: 1 | OUTPATIENT
Start: 2023-05-09 | End: 2023-06-06

## 2023-05-05 RX ORDER — DICYCLOMINE HYDROCHLORIDE 20 MG/1
1 TABLET AS NEEDED TABLET ORAL THREE TIMES A DAY
Qty: 45 TABLET | Refills: 2 | Status: ON HOLD | COMMUNITY
Start: 2023-04-07 | End: 2023-05-22

## 2023-05-05 RX ORDER — ERGOCALCIFEROL 1.25 MG/1
TAKE 1 CAPSULE BY MOUTH ONCE WEEKLY CAPSULE, LIQUID FILLED ORAL
Qty: 4 CAPSULE | Refills: 3 | Status: ACTIVE | COMMUNITY

## 2023-05-09 PROBLEM — 197125005: Status: ACTIVE | Noted: 2023-05-09

## 2023-09-05 ENCOUNTER — OFFICE VISIT (OUTPATIENT)
Dept: URBAN - METROPOLITAN AREA CLINIC 35 | Facility: CLINIC | Age: 33
End: 2023-09-05
Payer: COMMERCIAL

## 2023-09-05 VITALS
WEIGHT: 145 LBS | HEART RATE: 74 BPM | OXYGEN SATURATION: 99 % | DIASTOLIC BLOOD PRESSURE: 62 MMHG | BODY MASS INDEX: 23.3 KG/M2 | HEIGHT: 66 IN | SYSTOLIC BLOOD PRESSURE: 100 MMHG

## 2023-09-05 DIAGNOSIS — K50.818 CROHN'S DISEASE OF BOTH SMALL AND LARGE INTESTINE WITH OTHER COMPLICATION: ICD-10-CM

## 2023-09-05 DIAGNOSIS — R19.8 CHANGE IN BOWEL MOVEMENT: ICD-10-CM

## 2023-09-05 DIAGNOSIS — R10.30 LOWER ABDOMINAL PAIN: ICD-10-CM

## 2023-09-05 PROCEDURE — 99214 OFFICE O/P EST MOD 30 MIN: CPT | Performed by: INTERNAL MEDICINE

## 2023-09-05 RX ORDER — ERGOCALCIFEROL 1.25 MG/1
TAKE 1 CAPSULE BY MOUTH ONCE WEEKLY CAPSULE, LIQUID FILLED ORAL
Qty: 4 CAPSULE | Refills: 3 | Status: ACTIVE | COMMUNITY

## 2023-09-05 RX ORDER — SODIUM, POTASSIUM,MAG SULFATES 17.5-3.13G
177 ML SOLUTION, RECONSTITUTED, ORAL ORAL ONCE
Qty: 1 KIT | Refills: 0 | COMMUNITY
Start: 2020-09-21

## 2023-09-05 RX ORDER — MESALAMINE 1.2 G/1
TAKE 4 TABLETS (4.8 G TOTAL) BY MOUTH DAILY WITH BREAKFAST TABLET, DELAYED RELEASE ORAL ONCE A DAY
Qty: 360 UNSPECIFIED | Refills: 3 | COMMUNITY

## 2023-09-05 RX ORDER — ESCITALOPRAM OXALATE 10 MG/1
1 TABLET TABLET, FILM COATED ORAL ONCE A DAY
Status: ACTIVE | COMMUNITY

## 2023-09-05 RX ORDER — PANTOPRAZOLE SODIUM 20 MG/1
1 TABLET TABLET, DELAYED RELEASE ORAL BID
Qty: 180 TABLET | Refills: 1 | COMMUNITY
Start: 2020-02-05

## 2023-09-05 RX ORDER — PANTOPRAZOLE SODIUM 20 MG/1
1 TABLET TABLET, DELAYED RELEASE ORAL BID
Qty: 180 TABLET | Refills: 3 | COMMUNITY
Start: 2022-09-01

## 2023-09-05 RX ORDER — HYOSCYAMINE SULFATE 0.125 MG
1 TABLET ON THE TONGUE AND ALLOW TO DISSOLVE  AS NEEDED TABLET,DISINTEGRATING ORAL
Qty: 30 | Refills: 1 | COMMUNITY
Start: 2020-01-08

## 2023-09-05 RX ORDER — ONDANSETRON 4 MG/1
1 TABLET TABLET ORAL
Qty: 30 | Refills: 1 | COMMUNITY
Start: 2020-01-08

## 2023-09-05 RX ORDER — SERTRALINE HYDROCHLORIDE 25 MG/1
1 TABLET TABLET, FILM COATED ORAL ONCE A DAY
COMMUNITY

## 2023-09-05 NOTE — HPI-TODAY'S VISIT:
34 y/o female, i comes for f/u visit.  She denies any associated symptoms at this time.    Last set of labs  from April 2023  CRP: 1.3 Sed rate 6 WBC  5.9   HGB 12   HCT 35.4     Stool for calpro: NL at 33.3 No pathogens No O+P   CT Enterography: 4/20/23: No evidence for active IBD. NL appearance of GI tract.  Patient having 2 BMs a day, NL BM's. Currently on an antibiotic for an infection on a finger: Doxycycline Lower abdominal pain resolved. No new GI issues today

## 2023-10-02 ENCOUNTER — LAB OUTSIDE AN ENCOUNTER (OUTPATIENT)
Dept: URBAN - METROPOLITAN AREA CLINIC 35 | Facility: CLINIC | Age: 33
End: 2023-10-02

## 2024-03-05 ENCOUNTER — OV EP (OUTPATIENT)
Dept: URBAN - METROPOLITAN AREA CLINIC 35 | Facility: CLINIC | Age: 34
End: 2024-03-05
Payer: COMMERCIAL

## 2024-03-05 VITALS
HEART RATE: 75 BPM | WEIGHT: 153 LBS | DIASTOLIC BLOOD PRESSURE: 80 MMHG | HEIGHT: 66 IN | OXYGEN SATURATION: 98 % | BODY MASS INDEX: 24.59 KG/M2 | SYSTOLIC BLOOD PRESSURE: 105 MMHG

## 2024-03-05 DIAGNOSIS — Z86.39 HISTORY OF IRON DEFICIENCY: ICD-10-CM

## 2024-03-05 DIAGNOSIS — K50.818 CROHN'S DISEASE OF BOTH SMALL AND LARGE INTESTINE WITH OTHER COMPLICATION: ICD-10-CM

## 2024-03-05 PROCEDURE — 99214 OFFICE O/P EST MOD 30 MIN: CPT | Performed by: INTERNAL MEDICINE

## 2024-03-05 RX ORDER — SODIUM, POTASSIUM,MAG SULFATES 17.5-3.13G
177 ML SOLUTION, RECONSTITUTED, ORAL ORAL ONCE
Qty: 1 KIT | Refills: 0 | COMMUNITY
Start: 2020-09-21

## 2024-03-05 RX ORDER — HYOSCYAMINE SULFATE 0.125 MG
1 TABLET ON THE TONGUE AND ALLOW TO DISSOLVE  AS NEEDED TABLET,DISINTEGRATING ORAL
Qty: 30 | Refills: 1 | COMMUNITY
Start: 2020-01-08

## 2024-03-05 RX ORDER — ONDANSETRON 4 MG/1
1 TABLET TABLET ORAL
Qty: 30 | Refills: 1 | COMMUNITY
Start: 2020-01-08

## 2024-03-05 RX ORDER — ESCITALOPRAM OXALATE 10 MG/1
1 TABLET TABLET, FILM COATED ORAL ONCE A DAY
Status: ACTIVE | COMMUNITY

## 2024-03-05 RX ORDER — PANTOPRAZOLE SODIUM 20 MG/1
1 TABLET TABLET, DELAYED RELEASE ORAL BID
Qty: 180 TABLET | Refills: 3 | COMMUNITY
Start: 2022-09-01

## 2024-03-05 RX ORDER — MESALAMINE 1.2 G/1
TAKE 4 TABLETS (4.8 G TOTAL) BY MOUTH DAILY WITH BREAKFAST TABLET, DELAYED RELEASE ORAL ONCE A DAY
Qty: 360 UNSPECIFIED | Refills: 3 | COMMUNITY

## 2024-03-05 RX ORDER — ERGOCALCIFEROL 1.25 MG/1
TAKE 1 CAPSULE BY MOUTH ONCE WEEKLY CAPSULE, LIQUID FILLED ORAL
Qty: 4 CAPSULE | Refills: 3 | Status: ACTIVE | COMMUNITY

## 2024-03-05 RX ORDER — PANTOPRAZOLE SODIUM 20 MG/1
1 TABLET TABLET, DELAYED RELEASE ORAL BID
Qty: 180 TABLET | Refills: 1 | COMMUNITY
Start: 2020-02-05

## 2024-03-05 RX ORDER — SERTRALINE HYDROCHLORIDE 25 MG/1
1 TABLET TABLET, FILM COATED ORAL ONCE A DAY
COMMUNITY

## 2024-03-05 NOTE — HPI-TODAY'S VISIT:
33 year old female patient  with previous history of Crohn's disease of small and large intestine, presents today for her 6-month follow up. Patient admits relief of symptoms without medication as prescribed. Patient currently  admits 2-3 bowel movements per day with semisolid consistency, with small amount of bright red blood  on tissue, without pain, melena, or mucus. She has been off her usual eating habits. Patient also has been feeling a bit lightheaded, shaky/jittery, and she wonders if her iron  levels are low again. She has had heavier menstrual periods in the past when off BCP.   LABS - Did not do it CBC:  ESR:  CRP:  HFP:  Calprotectin:

## 2024-03-07 PROBLEM — 427762006: Status: ACTIVE | Noted: 2024-03-07

## 2024-03-13 LAB
ABSOLUTE BASOPHILS: 81
ABSOLUTE EOSINOPHILS: 89
ABSOLUTE LYMPHOCYTES: 1798
ABSOLUTE MONOCYTES: 348
ABSOLUTE NEUTROPHILS: 5084
BASOPHILS: 1.1
C-REACTIVE PROTEIN, QUANT: 6.7
EOSINOPHILS: 1.2
FERRITIN, SERUM: 38
FOLATE (FOLIC ACID), SERUM: >24
HEMATOCRIT: 36.2
HEMOGLOBIN: 11.9
IRON BIND.CAP.(TIBC): 332
IRON SATURATION: 34
IRON: 114
LYMPHOCYTES: 24.3
MCH: 29.5
MCHC: 32.9
MCV: 89.8
MONOCYTES: 4.7
MPV: 10.3
NEUTROPHILS: 68.7
PLATELET COUNT: 304
RDW: 11.8
RED BLOOD CELL COUNT: 4.03
SED RATE BY MODIFIED: 6
VITAMIN B12: 429
WHITE BLOOD CELL COUNT: 7.4

## 2024-09-05 ENCOUNTER — OFFICE VISIT (OUTPATIENT)
Dept: URBAN - METROPOLITAN AREA CLINIC 35 | Facility: CLINIC | Age: 34
End: 2024-09-05

## 2024-09-27 ENCOUNTER — OFFICE VISIT (OUTPATIENT)
Dept: URBAN - METROPOLITAN AREA CLINIC 31 | Facility: CLINIC | Age: 34
End: 2024-09-27

## 2024-09-27 NOTE — HPI-TODAY'S VISIT:
34 year old female patient with previous history of Crohn's disease of small and large intestine (2016), Fe deficiency, MVP, Anemia, Anxiety, Vit D deficiency presents today for her 6 month follow-up.     Last appointment  Off [facility]    Labs - 03/12/2024  ESR: WNL  Vit B12 + Folate: WNL  Fe + TIBC: WNL  Ferritin: WNL  CRP: WNL  CBC: WNL

## 2024-12-05 ENCOUNTER — TELEPHONE ENCOUNTER (OUTPATIENT)
Dept: URBAN - METROPOLITAN AREA CLINIC 33 | Facility: CLINIC | Age: 34
End: 2024-12-05

## 2024-12-05 RX ORDER — HYDROCORTISONE ACETATE 25 MG/1
1 SUPPOSITORY SUPPOSITORY RECTAL TWICE A DAY
Qty: 20 | Refills: 1 | OUTPATIENT
Start: 2024-12-11 | End: 2024-12-31

## 2024-12-06 ENCOUNTER — OFFICE VISIT (OUTPATIENT)
Dept: URBAN - METROPOLITAN AREA CLINIC 31 | Facility: CLINIC | Age: 34
End: 2024-12-06

## 2025-01-29 ENCOUNTER — OFFICE VISIT (OUTPATIENT)
Dept: URBAN - METROPOLITAN AREA CLINIC 35 | Facility: CLINIC | Age: 35
End: 2025-01-29

## 2025-01-29 ENCOUNTER — DASHBOARD ENCOUNTERS (OUTPATIENT)
Age: 35
End: 2025-01-29

## 2025-01-29 VITALS
DIASTOLIC BLOOD PRESSURE: 70 MMHG | BODY MASS INDEX: 24.59 KG/M2 | WEIGHT: 153 LBS | HEART RATE: 82 BPM | OXYGEN SATURATION: 99 % | HEIGHT: 66 IN | SYSTOLIC BLOOD PRESSURE: 105 MMHG

## 2025-01-29 RX ORDER — HYOSCYAMINE SULFATE 0.12 MG/1
1 TABLET ON THE TONGUE AND ALLOW TO DISSOLVE AS NEEDED TABLET, CHEWABLE ORAL
Qty: 60 | Refills: 3 | OUTPATIENT
Start: 2025-01-29 | End: 2025-05-29

## 2025-01-29 RX ORDER — HYDROCORTISONE 2.5% 25 MG/G
1 APPLICATION CREAM TOPICAL TWICE A DAY
Qty: 1 | Refills: 0 | OUTPATIENT
Start: 2025-01-29 | End: 2025-02-08

## 2025-01-29 RX ORDER — SERTRALINE HYDROCHLORIDE 25 MG/1
1 TABLET TABLET, FILM COATED ORAL ONCE A DAY
COMMUNITY

## 2025-01-29 RX ORDER — PANTOPRAZOLE SODIUM 20 MG/1
1 TABLET TABLET, DELAYED RELEASE ORAL BID
Qty: 180 TABLET | Refills: 1 | COMMUNITY
Start: 2020-02-05

## 2025-01-29 RX ORDER — PANTOPRAZOLE SODIUM 20 MG/1
1 TABLET TABLET, DELAYED RELEASE ORAL BID
Qty: 180 TABLET | Refills: 3 | COMMUNITY
Start: 2022-09-01

## 2025-01-29 RX ORDER — ONDANSETRON 4 MG/1
1 TABLET TABLET ORAL
Qty: 30 | Refills: 1 | COMMUNITY
Start: 2020-01-08

## 2025-01-29 RX ORDER — MULTIVIT-MIN/IRON/FOLIC ACID/K 18-600-40
AS DIRECTED CAPSULE ORAL
Status: ACTIVE | COMMUNITY

## 2025-01-29 RX ORDER — ERGOCALCIFEROL 1.25 MG/1
TAKE 1 CAPSULE BY MOUTH ONCE WEEKLY CAPSULE, LIQUID FILLED ORAL
Qty: 4 CAPSULE | Refills: 3 | Status: ACTIVE | COMMUNITY

## 2025-01-29 RX ORDER — HYOSCYAMINE SULFATE 0.125 MG
1 TABLET ON THE TONGUE AND ALLOW TO DISSOLVE  AS NEEDED TABLET,DISINTEGRATING ORAL
Qty: 30 | Refills: 1 | COMMUNITY
Start: 2020-01-08

## 2025-01-29 RX ORDER — MESALAMINE 1.2 G/1
TAKE 4 TABLETS (4.8 G TOTAL) BY MOUTH DAILY WITH BREAKFAST TABLET, DELAYED RELEASE ORAL ONCE A DAY
Qty: 360 UNSPECIFIED | Refills: 3 | COMMUNITY

## 2025-01-29 RX ORDER — SODIUM, POTASSIUM,MAG SULFATES 17.5-3.13G
177 ML SOLUTION, RECONSTITUTED, ORAL ORAL ONCE
Qty: 1 KIT | Refills: 0 | COMMUNITY
Start: 2020-09-21

## 2025-01-29 RX ORDER — ESCITALOPRAM OXALATE 10 MG/1
1 TABLET TABLET, FILM COATED ORAL ONCE A DAY
Status: ACTIVE | COMMUNITY

## 2025-01-29 NOTE — HPI-TODAY'S VISIT:
34 year old female patient with previous history of Crohn's disease of small and large intestine (2016), Fe deficiency, MVP, Anemia, Anxiety, Vit D deficiency presents today for her 10 month follow-up. Patient mentions she has sporadic flare ups, predominantly with abdominal pain. Patient currently mentions 1 bowel movements per day with solid consistency without blood melena or pain.    Patient is still having anal pruritus, happens during the day, but mostly at night. Patient has spongiotic dermatitis diagnosed years ago, and she uses a steroid cream as needed. She uses dye free detergents, mild soaps etc. Just got a Bidet in [employer] to wipe less.  Also still has sporadic abdominal pains. Last episode was last week, had intense sharp and crampy lower abdomen pain that then radiates up and makes her nauseous. She kept getting to bathroom to have a BM to feel better, with maybe a small BM if she strains a lot or none. She has had eggs the night before, and she noticed another episode after consuming eggs. This episodes happen every other month. Trigger foods: seeds, nuts, popcorn, dairy, if she eats a salad alone, eggs, spicy Last appointment  Off Lialda    Labs - 03/12/2024  ESR: WNL  Vit B12 + Folate: WNL  Fe + TIBC: WNL  Ferritin: WNL  CRP: WNL  CBC: WNL

## 2025-01-29 NOTE — PHYSICAL EXAM GASTROINTESTINAL
Abdomen , soft, nontender, nondistended , no guarding or rigidity , no masses palpable , normal bowel sounds , Liver and Spleen,  no hepatosplenomegaly , liver nontender Rectal exam: no external lesions, discharge, fistulas, hemorrhoids. Digital exam not done

## 2025-02-04 ENCOUNTER — LAB OUTSIDE AN ENCOUNTER (OUTPATIENT)
Dept: URBAN - METROPOLITAN AREA CLINIC 35 | Facility: CLINIC | Age: 35
End: 2025-02-04

## 2025-02-07 LAB
CALPROTECTIN, STOOL - QDX: (no result)
Lab: (no result)
OVA AND PARASITE - QDX: (no result)

## 2025-02-11 ENCOUNTER — TELEPHONE ENCOUNTER (OUTPATIENT)
Dept: URBAN - METROPOLITAN AREA CLINIC 35 | Facility: CLINIC | Age: 35
End: 2025-02-11

## 2025-02-11 RX ORDER — IRON FUM,PS CMP/VIT C/NIACIN 125-40-3MG
1 CAPSULE BETWEEN MEALS CAPSULE ORAL ONCE A DAY
Qty: 90 CAPSULE | Refills: 1 | OUTPATIENT
Start: 2025-02-11

## 2025-06-12 ENCOUNTER — LAB OUTSIDE AN ENCOUNTER (OUTPATIENT)
Dept: URBAN - METROPOLITAN AREA CLINIC 35 | Facility: CLINIC | Age: 35
End: 2025-06-12

## 2025-07-13 ENCOUNTER — TELEPHONE ENCOUNTER (OUTPATIENT)
Dept: URBAN - METROPOLITAN AREA CLINIC 35 | Facility: CLINIC | Age: 35
End: 2025-07-13

## 2025-07-29 ENCOUNTER — OFFICE VISIT (OUTPATIENT)
Dept: URBAN - METROPOLITAN AREA CLINIC 35 | Facility: CLINIC | Age: 35
End: 2025-07-29

## 2025-07-29 RX ORDER — SERTRALINE HYDROCHLORIDE 25 MG/1
1 TABLET TABLET, FILM COATED ORAL ONCE A DAY
COMMUNITY

## 2025-07-29 RX ORDER — MESALAMINE 1.2 G/1
TAKE 4 TABLETS (4.8 G TOTAL) BY MOUTH DAILY WITH BREAKFAST TABLET, DELAYED RELEASE ORAL ONCE A DAY
Qty: 360 UNSPECIFIED | Refills: 3 | COMMUNITY

## 2025-07-29 RX ORDER — ERGOCALCIFEROL 1.25 MG/1
TAKE 1 CAPSULE BY MOUTH ONCE WEEKLY CAPSULE, LIQUID FILLED ORAL
Qty: 4 CAPSULE | Refills: 3 | Status: ACTIVE | COMMUNITY

## 2025-07-29 RX ORDER — SODIUM, POTASSIUM,MAG SULFATES 17.5-3.13G
177 ML SOLUTION, RECONSTITUTED, ORAL ORAL ONCE
Qty: 1 KIT | Refills: 0 | COMMUNITY
Start: 2020-09-21

## 2025-07-29 RX ORDER — ESCITALOPRAM OXALATE 10 MG/1
1 TABLET TABLET, FILM COATED ORAL ONCE A DAY
Status: ON HOLD | COMMUNITY

## 2025-07-29 RX ORDER — PANTOPRAZOLE SODIUM 20 MG/1
1 TABLET TABLET, DELAYED RELEASE ORAL BID
Qty: 180 TABLET | Refills: 1 | COMMUNITY
Start: 2020-02-05

## 2025-07-29 RX ORDER — IRON FUM,PS CMP/VIT C/NIACIN 125-40-3MG
1 CAPSULE BETWEEN MEALS CAPSULE ORAL ONCE A DAY
Qty: 90 CAPSULE | Refills: 1 | Status: ACTIVE | COMMUNITY
Start: 2025-02-11

## 2025-07-29 RX ORDER — ONDANSETRON HYDROCHLORIDE 4 MG/1
1 TABLET TABLET, FILM COATED ORAL
Qty: 30 | Refills: 1 | COMMUNITY
Start: 2020-01-08

## 2025-07-29 RX ORDER — HYOSCYAMINE SULFATE 0.125 MG
1 TABLET ON THE TONGUE AND ALLOW TO DISSOLVE  AS NEEDED TABLET,DISINTEGRATING ORAL
Qty: 30 | Refills: 1 | COMMUNITY
Start: 2020-01-08

## 2025-07-29 RX ORDER — MULTIVIT-MIN/IRON/FOLIC ACID/K 18-600-40
AS DIRECTED CAPSULE ORAL
Status: ON HOLD | COMMUNITY

## 2025-07-29 RX ORDER — PANTOPRAZOLE SODIUM 20 MG/1
1 TABLET TABLET, DELAYED RELEASE ORAL BID
Qty: 180 TABLET | Refills: 3 | COMMUNITY
Start: 2022-09-01

## 2025-07-29 NOTE — HPI-TODAY'S VISIT:
A 34 year old female patient with previous history of Crohn's disease of small and large intestine, Fe deficiency, Generalized abd pain, anal pruritus presents today for her 6-month follow-up.  Patient states she has seen a dermatologist and was given a steroid cream that help relieve the anal pruritus. Patient was told it is related to her hx of Eczema. Patient also has a bidet. Patient states she scheduled for next Tuesday for breast reduction and lift. Patient she has symptoms of constipation and bloating.  Patient states she thinks the symptoms are caused by her recent symptoms of a old and not doing her daily routine of exercising.   Patient currently admits  1 bowel movements per day with  normal  and formed consistency without rectal pain, melena or blood.  Patient states she is struggling to get her stool out for last few days. Patient has not been eating well and not exercising because she has been fighting a cold. She ran for the first time in a few days.  She states she does not feel as though she is completely empty after she has bowel movement. Patient states she feels athough she is doing really well. Patient denies fatigue, nausea or loss of appetite.  Patient also recently came off Lexapro 5 mg, 3 weeks ago, which she was on for post partum OCD.      Last Appointment Nulev 0.125 mg TID PRN  Proctozone-HC cream BID  Integra for 6m    StoolDx - 2/4/2025  Pinworm: Not detected  Calprotectin: WNL   Ova/Parasite: Not detected    Labs - 2/5/2025  BMP: WNL  CRP: WNL  HFP: WNL  CBC: WNL  Fe+TIBC: WNL except Fe% 15 (L)  Ferritin: WNL    Labs - 7/1/2025  Fe+TIBC: WNL   Ferritin: WNL